# Patient Record
Sex: MALE | Race: WHITE | NOT HISPANIC OR LATINO | Employment: FULL TIME | URBAN - METROPOLITAN AREA
[De-identification: names, ages, dates, MRNs, and addresses within clinical notes are randomized per-mention and may not be internally consistent; named-entity substitution may affect disease eponyms.]

---

## 2019-01-23 PROBLEM — K31.7 POLYP OF STOMACH AND DUODENUM: Status: ACTIVE | Noted: 2019-01-23

## 2019-02-06 ENCOUNTER — ANESTHESIA EVENT (OUTPATIENT)
Dept: PERIOP | Facility: HOSPITAL | Age: 59
End: 2019-02-06
Payer: COMMERCIAL

## 2019-02-06 RX ORDER — SODIUM CHLORIDE, SODIUM LACTATE, POTASSIUM CHLORIDE, CALCIUM CHLORIDE 600; 310; 30; 20 MG/100ML; MG/100ML; MG/100ML; MG/100ML
125 INJECTION, SOLUTION INTRAVENOUS CONTINUOUS
Status: CANCELLED | OUTPATIENT
Start: 2019-02-06

## 2019-02-07 ENCOUNTER — HOSPITAL ENCOUNTER (OUTPATIENT)
Facility: HOSPITAL | Age: 59
Setting detail: OUTPATIENT SURGERY
Discharge: HOME | End: 2019-02-07
Attending: INTERNAL MEDICINE | Admitting: INTERNAL MEDICINE
Payer: COMMERCIAL

## 2019-02-07 ENCOUNTER — ANESTHESIA (OUTPATIENT)
Dept: PERIOP | Facility: HOSPITAL | Age: 59
End: 2019-02-07
Payer: COMMERCIAL

## 2019-02-07 VITALS
OXYGEN SATURATION: 97 % | BODY MASS INDEX: 27.16 KG/M2 | HEIGHT: 71 IN | HEART RATE: 55 BPM | SYSTOLIC BLOOD PRESSURE: 118 MMHG | DIASTOLIC BLOOD PRESSURE: 70 MMHG | RESPIRATION RATE: 18 BRPM | TEMPERATURE: 98 F | WEIGHT: 194 LBS

## 2019-02-07 DIAGNOSIS — K31.7 POLYP OF STOMACH AND DUODENUM: ICD-10-CM

## 2019-02-07 PROCEDURE — 88305 TISSUE EXAM BY PATHOLOGIST: CPT | Performed by: PATHOLOGY

## 2019-02-07 PROCEDURE — 88342 IMHCHEM/IMCYTCHM 1ST ANTB: CPT | Performed by: PATHOLOGY

## 2019-02-07 PROCEDURE — 43239 EGD BIOPSY SINGLE/MULTIPLE: CPT | Performed by: INTERNAL MEDICINE

## 2019-02-07 RX ORDER — SODIUM CHLORIDE, SODIUM LACTATE, POTASSIUM CHLORIDE, CALCIUM CHLORIDE 600; 310; 30; 20 MG/100ML; MG/100ML; MG/100ML; MG/100ML
INJECTION, SOLUTION INTRAVENOUS CONTINUOUS PRN
Status: DISCONTINUED | OUTPATIENT
Start: 2019-02-07 | End: 2019-02-07 | Stop reason: SURG

## 2019-02-07 RX ORDER — PROPOFOL 10 MG/ML
INJECTION, EMULSION INTRAVENOUS AS NEEDED
Status: DISCONTINUED | OUTPATIENT
Start: 2019-02-07 | End: 2019-02-07 | Stop reason: SURG

## 2019-02-07 RX ORDER — OMEPRAZOLE 40 MG/1
CAPSULE, DELAYED RELEASE ORAL
COMMUNITY
Start: 2017-02-21

## 2019-02-07 RX ORDER — ALBUTEROL SULFATE 90 UG/1
AEROSOL, METERED RESPIRATORY (INHALATION)
COMMUNITY
Start: 2016-02-11

## 2019-02-07 RX ORDER — LIDOCAINE HYDROCHLORIDE 10 MG/ML
INJECTION, SOLUTION EPIDURAL; INFILTRATION; INTRACAUDAL; PERINEURAL AS NEEDED
Status: DISCONTINUED | OUTPATIENT
Start: 2019-02-07 | End: 2019-02-07 | Stop reason: SURG

## 2019-02-07 RX ADMIN — LIDOCAINE HYDROCHLORIDE 20 MG: 10 INJECTION, SOLUTION EPIDURAL; INFILTRATION; INTRACAUDAL; PERINEURAL at 08:38

## 2019-02-07 RX ADMIN — PROPOFOL 120 MG: 10 INJECTION, EMULSION INTRAVENOUS at 08:38

## 2019-02-07 RX ADMIN — PROPOFOL 20 MG: 10 INJECTION, EMULSION INTRAVENOUS at 08:41

## 2019-02-07 RX ADMIN — SODIUM CHLORIDE, SODIUM LACTATE, POTASSIUM CHLORIDE, AND CALCIUM CHLORIDE: .6; .31; .03; .02 INJECTION, SOLUTION INTRAVENOUS at 08:18

## 2019-02-07 RX ADMIN — PROPOFOL 30 MG: 10 INJECTION, EMULSION INTRAVENOUS at 08:40

## 2019-02-07 NOTE — ANESTHESIA PREPROCEDURE EVALUATION
Review of Systems/Medical History  Patient summary reviewed  Chart reviewed  No history of anesthetic complications     Cardiovascular  Negative cardio ROS    Pulmonary  Asthma ,        GI/Hepatic    GERD ,             Endo/Other     GYN       Hematology   Musculoskeletal       Neurology   Psychology           Physical Exam    Airway    Mallampati score: II  TM Distance: >3 FB  Neck ROM: full     Dental       Cardiovascular  Comment: Negative ROS,     Pulmonary      Other Findings        Anesthesia Plan  ASA Score- 2     Anesthesia Type- IV sedation with anesthesia with ASA Monitors  Additional Monitors:   Airway Plan:         Plan Factors-    Induction- intravenous  Postoperative Plan-     Informed Consent- Anesthetic plan and risks discussed with patient  I personally reviewed this patient with the CRNA  Discussed and agreed on the Anesthesia Plan with the CRNA  Tk Payne

## 2019-02-07 NOTE — ANESTHESIA POSTPROCEDURE EVALUATION
Post-Op Assessment Note      CV Status:  Stable    Mental Status:  Lethargic    Hydration Status:  Stable    PONV Controlled:  None    Airway Patency:  Patent and adequate    Post Op Vitals Reviewed: Yes          Staff: CRNA           BP   116/66   Temp     Pulse  63   Resp   14   SpO2   100

## 2019-02-07 NOTE — H&P
History and Physical - [facility]  Zelda Grace 62 y o  male MRN: [id number]      HPI: Zelda Grace is a 62y o  year old male who presents for evaluation of gastroesophageal reflux disease and Gómez's esophagus      REVIEW OF SYSTEMS: Per the HPI, and otherwise unremarkable  Historical Information   Past Medical History:   Diagnosis Date    Asthma      Past Surgical History:   Procedure Laterality Date    COLONOSCOPY      KNEE ARTHROSCOPY      ROTATOR CUFF REPAIR       Social History   History   Alcohol Use    Yes     Comment: occacionally     History   Drug Use No     History   Smoking Status    Never Smoker   Smokeless Tobacco    Never Used     History reviewed  No pertinent family history  Meds/Allergies     Prescriptions Prior to Admission   Medication    albuterol (VENTOLIN HFA) 90 mcg/act inhaler    DOCOSAHEXAENOIC ACID PO    mometasone (ASMANEX TWISTHALER) 220 MCG/INH inhaler    omeprazole (PriLOSEC) 40 MG capsule       Allergies   Allergen Reactions    Sulfa Antibiotics Hives     Other reaction(s): hives       Objective     Blood pressure 132/91, pulse 61, temperature (!) 97 3 °F (36 3 °C), temperature source Oral, resp  rate 18, height 5' 11" (1 803 m), weight 88 kg (194 lb 0 1 oz), SpO2 97 %  PHYSICAL EXAM    Gen: NAD  CV: RRR  CHEST: Clear  ABD: soft, NT/ND  EXT: no edema      ASSESSMENT/PLAN:  This is a 62y o  year old male here for evaluation of gastroesophageal reflux disease, Gómez's esophagus, and he is stable and optimized for his procedure      Perform an esophagogastroduodenoscopy with biopsy

## 2019-02-07 NOTE — DISCHARGE INSTRUCTIONS
Upper Endoscopy   WHAT YOU NEED TO KNOW:   An upper endoscopy is also called an upper gastrointestinal (GI) endoscopy, or an esophagogastroduodenoscopy (EGD)  You may feel bloated, gassy, or have some abdominal discomfort after your procedure  Your throat may be sore for 24 to 36 hours  You may burp or pass gas from air that is still inside your body  DISCHARGE INSTRUCTIONS:   Call 911 for any of the following:   · You have sudden chest pain or trouble breathing  Seek care immediately if:   · You feel dizzy or faint  · You have trouble swallowing  · Your bowel movements are very dark or black  · Your abdomen is hard and firm and you have severe pain  · You vomit blood  Contact your healthcare provider if:   · You feel full or bloated and cannot burp or pass gas  · You have not had a bowel movement for 3 days after your procedure  · You have neck pain  · You have a fever or chills  · You have nausea or are vomiting  · You have a rash or hives  · You have questions or concerns about your endoscopy  Relieve a sore throat:  Suck on throat lozenges or crushed ice  Gargle with a small amount of warm salt water  Mix 1 teaspoon of salt and 1 cup of warm water to make salt water  Relieve gas and discomfort from bloating:  Lie on your right side with a heating pad on your abdomen  Take short walks to help pass gas  Eat small meals until bloating is relieved  Rest after your procedure: You have been given medicine to relax you  Do not  drive or make important decisions until the day after your procedure  Return to your normal activity as directed  You can usually return to work the day after your procedure  Follow up with your healthcare provider as directed:  Write down your questions so you remember to ask them during your visits     © 2017 [address] is for End User's use only and may not be sold, redistributed or otherwise used for commercial purposes  All illustrations and images included in CareNotes® are the copyrighted property of A D A M [employer] Inc  or Vasu Donohue  The above information is an  only  It is not intended as medical advice for individual conditions or treatments  Talk to your doctor, nurse or pharmacist before following any medical regimen to see if it is safe and effective for you

## 2019-02-07 NOTE — OP NOTE
ESOPHAGOGASTRODUODENOSCOPY    PROCEDURE: EGD    SEDATION: Monitored anesthesia care, check anesthesia records    ASA Class: 1    INDICATIONS:  Gastroesophageal reflux disease with Gómez's esophagus    CONSENT:  Informed consent was obtained for the procedure, including sedation after explaining the risks and benefits of the procedure  Risks including but not limited to bleeding, perforation, infection, and missed lesion  PREPARATION:   Telemetry, pulse oximetry, blood pressure were monitored throughout the procedure  Patient was identified by myself both verbally and by visual inspection of ID band  DESCRIPTION:   Patient was placed in the left lateral decubitus position and was sedated with the above medication  The gastroscope was introduced in to the oropharynx and the esophagus was intubated under direct visualization  Scope was passed down the esophagus up to 2nd part of the duodenum  A careful inspection was made as the gastroscope was withdrawn, including a retroflexed view of the stomach; findings and interventions are described below  FINDINGS:    #1  Esophagus- there is a 1 cm short segment of  Gómez's esophagus at 40 cm from the incisors  Four-quadrant cold biopsies were obtained to rule out dysplasia  #2  Stomach- A 2 cm hiatal hernia identified  The remaining cardia, fundus, body, antrum, and pylorus were otherwise normal   There was a single semi pedunculated polyp in the body of the stomach which was cold biopsied 2 times  There was minor bleeding that stopped spontaneously  The pylorus was patent and traversed without difficulty  #3  Duodenum- the 1st and 2nd portion of the duodenum were normal         IMPRESSIONS:      1  Short-segment Gómez's esophagus, 4 quadrant biopsies obtained to rule out dysplasia   2  Small hiatal hernia  3  Single polyp of the body of the stomach status post cold biopsy    RECOMMENDATIONS:     1  Continue all current medical therapy  2   Follow up on the results of the routine biopsies obtained in the short-segment Gómez's esophagus  3  Repeat esophagogastroduodenoscopy in 3 years unless the biopsies show evidence of dysplasia in which case endoscopy surveillance will need to be more frequent     COMPLICATIONS:  None; patient tolerated the procedure well      SPECIMENS:  4 quadrant cold biopsies were obtained at the EG junction to rule out dysplasia, specimens were submitted in formalin    ESTIMATED BLOOD LOSS:  Minimal

## 2021-04-09 ENCOUNTER — IMMUNIZATIONS (OUTPATIENT)
Dept: FAMILY MEDICINE CLINIC | Facility: HOSPITAL | Age: 61
End: 2021-04-09

## 2022-04-25 ENCOUNTER — OFFICE VISIT (OUTPATIENT)
Dept: GASTROENTEROLOGY | Facility: CLINIC | Age: 62
End: 2022-04-25
Payer: COMMERCIAL

## 2022-04-25 VITALS
OXYGEN SATURATION: 94 % | BODY MASS INDEX: 27.72 KG/M2 | DIASTOLIC BLOOD PRESSURE: 82 MMHG | SYSTOLIC BLOOD PRESSURE: 122 MMHG | WEIGHT: 198 LBS | HEIGHT: 71 IN | HEART RATE: 84 BPM

## 2022-04-25 DIAGNOSIS — K22.70 BARRETT'S ESOPHAGUS WITHOUT DYSPLASIA: ICD-10-CM

## 2022-04-25 DIAGNOSIS — K21.9 GASTROESOPHAGEAL REFLUX DISEASE, UNSPECIFIED WHETHER ESOPHAGITIS PRESENT: Primary | ICD-10-CM

## 2022-04-25 PROCEDURE — 99213 OFFICE O/P EST LOW 20 MIN: CPT | Performed by: PHYSICIAN ASSISTANT

## 2022-04-25 RX ORDER — LORATADINE 10 MG/1
TABLET ORAL
COMMUNITY

## 2022-04-25 RX ORDER — FLUTICASONE FUROATE 200 UG/1
1 POWDER RESPIRATORY (INHALATION) DAILY
COMMUNITY
Start: 2022-03-14

## 2022-04-25 RX ORDER — MOMETASONE FUROATE 50 UG/1
2 SPRAY, METERED NASAL DAILY
COMMUNITY

## 2022-04-25 NOTE — PROGRESS NOTES
[facility] - Outpatient Follow-up Note  Tera Vu 64 y o  male MRN: [id number]  Encounter: [id number]          ASSESSMENT AND PLAN:      1  Gastroesophageal reflux disease, unspecified whether esophagitis present  2  Gómez's esophagus without dysplasia  He mostly does well on Omeprazole 40mg daily  He has occasional reflux and will take an additional 20mg tablet PRN with relief  Last egd was in 2019 with SSBE w/o dysplasia noted - will update screening    ______________________________________________________________________    SUBJECTIVE:  25-year-old male with a history of GERD complicated by Gómez's esophagus presents for evaluation prior to scheduling a screening EGD  His last EGD was in 2019  Short-segment Gómez's was noted  Biopsy showed intestinal metaplasia without dysplasia  He is taking omeprazole 40 mg once daily  He has occasional issues with heartburn and he will take an additional 20 mg tablet with relief  He denies dysphagia, odynophagia, hematemesis or melena  He has no unexpected weight loss  His last colonoscopy in 2016 was normal   Prior to that 2013 he had a normal colonoscopy as well  REVIEW OF SYSTEMS IS OTHERWISE NEGATIVE  Historical Information   Past Medical History:   Diagnosis Date    Asthma     Gómez esophagus     Diverticulitis of colon     Gastric polyps     GERD (gastroesophageal reflux disease)      Past Surgical History:   Procedure Laterality Date    COLONOSCOPY      KNEE ARTHROSCOPY      [place] ESOPHAGOGASTRODUODENOSCOPY TRANSORAL DIAGNOSTIC N/A 2/7/2019    Procedure: ESOPHAGOGASTRODUODENOSCOPY (EGD); Surgeon: Zach Waldrop DO;  Location: [facility];   Service: Gastroenterology    ROTATOR CUFF REPAIR       Social History   Social History     Substance and Sexual Activity   Alcohol Use Yes    Comment: occacionally     Social History     Substance and Sexual Activity   Drug Use No     Social History     Tobacco Use Smoking Status Never Smoker   Smokeless Tobacco Never Used     History reviewed  No pertinent family history  Meds/Allergies       Current Outpatient Medications:     albuterol (VENTOLIN HFA) 90 mcg/act inhaler    DOCOSAHEXAENOIC ACID PO    fluticasone (Arnuity Ellipta) 200 MCG/ACT AEPB inhaler    loratadine (CLARITIN) 10 mg tablet    mometasone (ASMANEX TWISTHALER) 220 MCG/INH inhaler    mometasone (NASONEX) 50 mcg/act nasal spray    omeprazole (PriLOSEC) 40 MG capsule    Allergies   Allergen Reactions    Sulfa Antibiotics Hives     Other reaction(s): hives           Objective     Blood pressure 122/82, pulse 84, height 5' 11" (1 803 m), weight 89 8 kg (198 lb), SpO2 94 %  Body mass index is 27 62 kg/m²  PHYSICAL EXAM:      General Appearance:   Alert, cooperative, no distress   HEENT:   Normocephalic, atraumatic, anicteric      Neck:  Supple, symmetrical, trachea midline   Lungs:   Clear to auscultation bilaterally; no rales, rhonchi or wheezing; respirations unlabored    Heart[de-identified]   Regular rate and rhythm; no murmur, rub, or gallop  Abdomen:   Soft, non-tender, non-distended; normal bowel sounds; no masses, no organomegaly    Genitalia:   Deferred    Rectal:   Deferred    Extremities:  No cyanosis, clubbing or edema    Pulses:  2+ and symmetric    Skin:  No jaundice, rashes, or lesions    Lymph nodes:  No palpable cervical lymphadenopathy        Lab Results:   No visits with results within 1 Day(s) from this visit     Latest known visit with results is:   Admission on 02/07/2019, Discharged on 02/07/2019   Component Date Value    Case Report 02/07/2019                      Value:Surgical Pathology Report                         Case: [id number]                                   Authorizing Provider:  Anna Guillermo DO          Collected:           02/07/2019 0840              Ordering Location:     [address] [place] [address] Received:            02/07/2019 1203 Operating Room                                                               Pathologist:           Mendel Spangle, DO                                                            Specimens:   A) - Esophagus, esophagus 40cm                                                                      B) - Polyp, Stomach/Small Intestine, body                                                  Final Diagnosis 02/07/2019                      Value: This result contains rich text formatting which cannot be displayed here   Microscopic Description 02/07/2019                      Value: This result contains rich text formatting which cannot be displayed here   Additional Information 02/07/2019                      Value: This result contains rich text formatting which cannot be displayed here  Maria Isabelaria Reil Gross Description 02/07/2019                      Value: This result contains rich text formatting which cannot be displayed here   Clinical Information 02/07/2019                      Value:Cold biopsies r/o dysplasia         Radiology Results:   No results found

## 2022-04-25 NOTE — PATIENT INSTRUCTIONS
Scheduled date of EGD(as of today):5/26/22   Physician performing EGD:Saritha  Location of EGD:[place]  Instructions reviewed with patient by:Karena BOURGEOIS  Clearances: none

## 2022-05-26 ENCOUNTER — ANESTHESIA EVENT (OUTPATIENT)
Dept: GASTROENTEROLOGY | Facility: HOSPITAL | Age: 62
End: 2022-05-26

## 2022-05-26 ENCOUNTER — ANESTHESIA (OUTPATIENT)
Dept: GASTROENTEROLOGY | Facility: HOSPITAL | Age: 62
End: 2022-05-26

## 2022-05-26 ENCOUNTER — HOSPITAL ENCOUNTER (OUTPATIENT)
Dept: GASTROENTEROLOGY | Facility: HOSPITAL | Age: 62
Setting detail: OUTPATIENT SURGERY
Discharge: HOME | End: 2022-05-26
Payer: COMMERCIAL

## 2022-05-26 VITALS
RESPIRATION RATE: 16 BRPM | TEMPERATURE: 97.8 F | WEIGHT: 193.34 LBS | HEIGHT: 71 IN | BODY MASS INDEX: 27.07 KG/M2 | SYSTOLIC BLOOD PRESSURE: 112 MMHG | DIASTOLIC BLOOD PRESSURE: 72 MMHG | HEART RATE: 57 BPM | OXYGEN SATURATION: 98 %

## 2022-05-26 DIAGNOSIS — K21.9 GASTROESOPHAGEAL REFLUX DISEASE, UNSPECIFIED WHETHER ESOPHAGITIS PRESENT: ICD-10-CM

## 2022-05-26 DIAGNOSIS — K22.70 BARRETT'S ESOPHAGUS WITHOUT DYSPLASIA: ICD-10-CM

## 2022-05-26 PROCEDURE — 43239 EGD BIOPSY SINGLE/MULTIPLE: CPT | Performed by: INTERNAL MEDICINE

## 2022-05-26 PROCEDURE — 88305 TISSUE EXAM BY PATHOLOGIST: CPT | Performed by: PATHOLOGY

## 2022-05-26 RX ORDER — SODIUM CHLORIDE, SODIUM LACTATE, POTASSIUM CHLORIDE, CALCIUM CHLORIDE 600; 310; 30; 20 MG/100ML; MG/100ML; MG/100ML; MG/100ML
125 INJECTION, SOLUTION INTRAVENOUS CONTINUOUS
Status: CANCELLED | OUTPATIENT
Start: 2022-05-26

## 2022-05-26 RX ORDER — PROPOFOL 10 MG/ML
INJECTION, EMULSION INTRAVENOUS AS NEEDED
Status: DISCONTINUED | OUTPATIENT
Start: 2022-05-26 | End: 2022-05-26

## 2022-05-26 RX ORDER — SODIUM CHLORIDE, SODIUM LACTATE, POTASSIUM CHLORIDE, CALCIUM CHLORIDE 600; 310; 30; 20 MG/100ML; MG/100ML; MG/100ML; MG/100ML
INJECTION, SOLUTION INTRAVENOUS CONTINUOUS PRN
Status: DISCONTINUED | OUTPATIENT
Start: 2022-05-26 | End: 2022-05-26

## 2022-05-26 RX ORDER — SODIUM CHLORIDE, SODIUM LACTATE, POTASSIUM CHLORIDE, CALCIUM CHLORIDE 600; 310; 30; 20 MG/100ML; MG/100ML; MG/100ML; MG/100ML
125 INJECTION, SOLUTION INTRAVENOUS CONTINUOUS
Status: DISCONTINUED | OUTPATIENT
Start: 2022-05-26 | End: 2022-05-30 | Stop reason: HOSPADM

## 2022-05-26 RX ORDER — LIDOCAINE HYDROCHLORIDE 20 MG/ML
INJECTION, SOLUTION EPIDURAL; INFILTRATION; INTRACAUDAL; PERINEURAL AS NEEDED
Status: DISCONTINUED | OUTPATIENT
Start: 2022-05-26 | End: 2022-05-26

## 2022-05-26 RX ADMIN — PROPOFOL 65 MG: 10 INJECTION, EMULSION INTRAVENOUS at 10:15

## 2022-05-26 RX ADMIN — PROPOFOL 45 MG: 10 INJECTION, EMULSION INTRAVENOUS at 10:18

## 2022-05-26 RX ADMIN — LIDOCAINE HYDROCHLORIDE 100 MG: 20 INJECTION, SOLUTION EPIDURAL; INFILTRATION; INTRACAUDAL at 10:14

## 2022-05-26 RX ADMIN — SODIUM CHLORIDE, SODIUM LACTATE, POTASSIUM CHLORIDE, AND CALCIUM CHLORIDE: .6; .31; .03; .02 INJECTION, SOLUTION INTRAVENOUS at 08:17

## 2022-05-26 RX ADMIN — PROPOFOL 75 MG: 10 INJECTION, EMULSION INTRAVENOUS at 10:16

## 2022-05-26 NOTE — ANESTHESIA POSTPROCEDURE EVALUATION
Post-Op Assessment Note    CV Status:  Stable  Pain Score: 0    Pain management: adequate     Mental Status:  Alert and awake   Hydration Status:  Euvolemic   PONV Controlled:  Controlled   Airway Patency:  Patent      Post Op Vitals Reviewed: Yes      Staff: CRNA         No complications documented      /58 (05/26/22 1025)    Temp 97 8 °F (36 6 °C) (05/26/22 1025)    Pulse 58 (05/26/22 1025)   Resp 16 (05/26/22 1025)    SpO2 95 % (05/26/22 1025)

## 2022-05-26 NOTE — H&P
History and Physical - [facility]  Gulshan Luna 64 y o  male MRN: [id number]      HPI: Gulshan Luna is a 64y o  year old male who presents for evaluation of gastroesophageal reflux disease and Gómez's esophagus      REVIEW OF SYSTEMS: Per the HPI, and otherwise unremarkable  Historical Information   Past Medical History:   Diagnosis Date    Asthma     Gómez esophagus     Colon polyp     Diverticulitis of colon     Gastric polyps     GERD (gastroesophageal reflux disease)      Past Surgical History:   Procedure Laterality Date    COLONOSCOPY      KNEE ARTHROSCOPY      [place] ESOPHAGOGASTRODUODENOSCOPY TRANSORAL DIAGNOSTIC N/A 2/7/2019    Procedure: ESOPHAGOGASTRODUODENOSCOPY (EGD); Surgeon: Aldo Morgan DO;  Location: [facility]; Service: Gastroenterology    ROTATOR CUFF REPAIR       Social History   Social History     Substance and Sexual Activity   Alcohol Use Yes    Comment: occacionally     Social History     Substance and Sexual Activity   Drug Use No     Social History     Tobacco Use   Smoking Status Never Smoker   Smokeless Tobacco Never Used     History reviewed  No pertinent family history  Meds/Allergies     (Not in a hospital admission)      Allergies   Allergen Reactions    Sulfa Antibiotics Hives     Other reaction(s): hives       Objective     Blood pressure 123/95, pulse 62, temperature 98 3 °F (36 8 °C), temperature source Temporal, resp  rate 16, height 5' 11" (1 803 m), weight 87 7 kg (193 lb 5 5 oz), SpO2 98 %  PHYSICAL EXAM    Gen: NAD  CV: RRR  CHEST: Clear  ABD: soft, NT/ND  EXT: no edema      ASSESSMENT/PLAN:  This is a 64y o  year old male here for EGD with biopsies, and he is stable and optimized for his procedure

## 2022-05-26 NOTE — ANESTHESIA PREPROCEDURE EVALUATION
Procedure:  EGD          HPI: Gulshan Luna is a 64y o  year old male who presents for evaluation of gastroesophageal reflux disease complicated by Gómez's esophagus     Physical Exam    Airway    Mallampati score: II  TM Distance: >3 FB  Neck ROM: full     Dental       Cardiovascular  Cardiovascular exam normal    Pulmonary  Pulmonary exam normal     Other Findings       History Comments History Comments   Asthma  GERD (gastroesophageal reflux disease)    Gómez esophagus  Diverticulitis of colon    Gastric polyps  Colon polyp        Surgical History     Current as of 05/26/22 0913  KNEE ARTHROSCOPY ROTATOR CUFF REPAIR   COLONOSCOPY [place] ESOPHAGOGASTRODUODENOSCOPY TRANSORAL DIAGNOSTIC     Substance History     Current as of 05/26/22 0913  Smoking Status: Never Smoker   Smokeless Tobacco Status: Never Used   Alcohol use: Yes, unspecified volume   Drug use: No       Anesthesia Plan  ASA Score- 2     Anesthesia Type- IV sedation with anesthesia with ASA Monitors  Additional Monitors:   Airway Plan:           Plan Factors-Exercise tolerance (METS): >4 METS  Chart reviewed  Existing labs reviewed  Patient summary reviewed  Patient is not a current smoker  Induction- intravenous  Postoperative Plan-     Informed Consent- Anesthetic plan and risks discussed with patient  I personally reviewed this patient with the CRNA  Discussed and agreed on the Anesthesia Plan with the CRNA  Kodi Shaikh

## 2022-05-26 NOTE — H&P
History and Physical - [facility]  Britany Whitfield 64 y o  male MRN: [id number]      HPI: Britany Whitfield is a 64y o  year old male who presents for evaluation of gastroesophageal reflux disease complicated by Gómez's esophagus      REVIEW OF SYSTEMS: Per the HPI, and otherwise unremarkable  Historical Information   Past Medical History:   Diagnosis Date    Asthma     Gómez esophagus     Colon polyp     Diverticulitis of colon     Gastric polyps     GERD (gastroesophageal reflux disease)      Past Surgical History:   Procedure Laterality Date    COLONOSCOPY      KNEE ARTHROSCOPY      [place] ESOPHAGOGASTRODUODENOSCOPY TRANSORAL DIAGNOSTIC N/A 2/7/2019    Procedure: ESOPHAGOGASTRODUODENOSCOPY (EGD); Surgeon: Romero Ford DO;  Location: [facility]; Service: Gastroenterology    ROTATOR CUFF REPAIR       Social History   Social History     Substance and Sexual Activity   Alcohol Use Yes    Comment: occacionally     Social History     Substance and Sexual Activity   Drug Use No     Social History     Tobacco Use   Smoking Status Never Smoker   Smokeless Tobacco Never Used     History reviewed  No pertinent family history  Meds/Allergies     (Not in a hospital admission)      Allergies   Allergen Reactions    Sulfa Antibiotics Hives     Other reaction(s): hives       Objective     Blood pressure 123/95, pulse 62, temperature 98 3 °F (36 8 °C), temperature source Temporal, resp  rate 16, height 5' 11" (1 803 m), weight 87 7 kg (193 lb 5 5 oz), SpO2 98 %  PHYSICAL EXAM    Gen: NAD  CV: RRR  CHEST: Clear  ABD: soft, NT/ND  EXT: no edema      ASSESSMENT/PLAN:  This is a 64y o  year old male here for EGD with biopsies, and he is stable and optimized for his procedure

## 2022-06-08 ENCOUNTER — TELEPHONE (OUTPATIENT)
Dept: GASTROENTEROLOGY | Facility: CLINIC | Age: 62
End: 2022-06-08

## 2022-06-08 NOTE — TELEPHONE ENCOUNTER
----- Message from Tres Muñoz DO sent at 6/8/2022 12:42 PM EDT -----  The please call the patient with the biopsy results  Biopsies the esophagus were unremarkable  There was no evidence of Gómez's esophagus or cancer

## 2024-06-02 ENCOUNTER — HOSPITAL ENCOUNTER (EMERGENCY)
Facility: HOSPITAL | Age: 64
Discharge: HOME | End: 2024-06-02
Attending: EMERGENCY MEDICINE
Payer: COMMERCIAL

## 2024-06-02 VITALS
RESPIRATION RATE: 18 BRPM | HEART RATE: 68 BPM | OXYGEN SATURATION: 98 % | DIASTOLIC BLOOD PRESSURE: 78 MMHG | SYSTOLIC BLOOD PRESSURE: 138 MMHG | TEMPERATURE: 98.2 F

## 2024-06-02 DIAGNOSIS — H53.9 VISUAL DISTURBANCE: Primary | ICD-10-CM

## 2024-06-02 PROCEDURE — 99283 EMERGENCY DEPT VISIT LOW MDM: CPT

## 2024-06-02 PROCEDURE — 99284 EMERGENCY DEPT VISIT MOD MDM: CPT | Performed by: EMERGENCY MEDICINE

## 2024-06-02 NOTE — ED PROVIDER NOTES
"History  Chief Complaint   Patient presents with    Eye Problem     Pt states \"I have a black spot in my vision from my L eye that started yesterday. Denies headache/dizziness/blurred vision. Denies injury to eye.\"     63-year-old male, presents with visual disturbance.  Patient states he has noticed thin, black, squiggly line in left eye, left upper lateral field of vision.  Constant since yesterday afternoon.  Denies any associated eye pain, blurry vision, loss of vision, headache.  Patient wears glasses.  No history of similar symptoms.      History provided by:  Patient   used: No        Prior to Admission Medications   Prescriptions Last Dose Informant Patient Reported? Taking?   DOCOSAHEXAENOIC ACID PO  Self Yes No   Sig: as directed   albuterol (PROVENTIL HFA,VENTOLIN HFA) 90 mcg/act inhaler  Self Yes No   Si puff(s)   fluticasone (Arnuity Ellipta) 200 MCG/ACT AEPB inhaler  Self Yes No   Sig: Take 1 puff by mouth daily   loratadine (CLARITIN) 10 mg tablet  Self Yes No   Si tab(s)   mometasone (ASMANEX TWISTHALER) 220 MCG/INH inhaler  Self Yes No   Sig: inhale 1 puff by mouth every 12 hours   mometasone (NASONEX) 50 mcg/act nasal spray  Self Yes No   Si sprays into each nostril daily   omeprazole (PriLOSEC) 40 MG capsule  Self Yes No   Si cap(s)      Facility-Administered Medications: None       Past Medical History:   Diagnosis Date    Asthma     Gómez esophagus     Colon polyp     Diverticulitis of colon     Gastric polyps     GERD (gastroesophageal reflux disease)        Past Surgical History:   Procedure Laterality Date    COLONOSCOPY      KNEE ARTHROSCOPY      [place] ESOPHAGOGASTRODUODENOSCOPY TRANSORAL DIAGNOSTIC N/A 2019    Procedure: ESOPHAGOGASTRODUODENOSCOPY (EGD);  Surgeon: Trip Melara DO;  Location: [facility];  Service: Gastroenterology    ROTATOR CUFF REPAIR         History reviewed. No pertinent family history.  I have reviewed and agree with the history " as documented.    E-Cigarette/Vaping    E-Cigarette Use Never User      E-Cigarette/Vaping Substances    Nicotine No     THC No     CBD No     Flavoring No     Other No     Unknown No      Social History     Tobacco Use    Smoking status: Never    Smokeless tobacco: Never   Vaping Use    Vaping status: Never Used   Substance Use Topics    Alcohol use: Yes     Comment: occacionally    Drug use: No       Review of Systems   Constitutional: Negative.  Negative for fever.   Eyes:  Negative for pain and redness.   Neurological: Negative.  Negative for weakness, numbness and headaches.       Physical Exam  Physical Exam  Vitals and nursing note reviewed.   Constitutional:       General: He is not in acute distress.  HENT:      Head: Normocephalic and atraumatic.   Eyes:      General: Vision grossly intact.      Extraocular Movements: Extraocular movements intact.      Conjunctiva/sclera: Conjunctivae normal.      Pupils: Pupils are equal, round, and reactive to light.      Visual Fields: Right eye visual fields normal and left eye visual fields normal.   Cardiovascular:      Rate and Rhythm: Normal rate.   Pulmonary:      Effort: Pulmonary effort is normal.   Neurological:      General: No focal deficit present.      Mental Status: He is alert and oriented to person, place, and time.      Motor: No weakness.      Gait: Gait normal.      Comments: No facial weakness, normal speech         Vital Signs  ED Triage Vitals [06/02/24 1826]   Temperature Pulse Respirations Blood Pressure SpO2   98.2 °F (36.8 °C) 68 18 151/82 98 %      Temp Source Heart Rate Source Patient Position - Orthostatic VS BP Location FiO2 (%)   Temporal Monitor Sitting Left arm --      Pain Score       --           Vitals:    06/02/24 1826 06/02/24 1930   BP: 151/82 138/78   Pulse: 68    Patient Position - Orthostatic VS: Sitting          Visual Acuity      ED Medications  Medications - No data to display    Diagnostic Studies  Results Reviewed        None                   No orders to display              Procedures  Procedures         ED Course                               SBIRT 20yo+      Flowsheet Row Most Recent Value   Initial Alcohol Screen: US AUDIT-C     1. How often do you have a drink containing alcohol? 0 Filed at: 06/02/2024 1828   2. How many drinks containing alcohol do you have on a typical day you are drinking?  0 Filed at: 06/02/2024 1828   3a. Male UNDER 65: How often do you have five or more drinks on one occasion? 0 Filed at: 06/02/2024 1828   Audit-C Score 0 Filed at: 06/02/2024 1828   NELLY: How many times in the past year have you...    Used an illegal drug or used a prescription medication for non-medical reasons? Never Filed at: 06/02/2024 1828                      Medical Decision Making  63-year-old male, presents with visual disturbance.  Patient reports squiggly line in the left eye left upper field of vision since yesterday.  Differential diagnosis includes retinal detachment, vitreous hemorrhage, foreign body among other diagnoses.  Patient visual fields intact, no abnormality noted on visual inspection of the eye.  Discussed with on-call ophthalmology Dr. Duke, will see patient in office tomorrow.  Patient states he will follow-up with ophthalmology tomorrow.             Disposition  Final diagnoses:   Visual disturbance     Time reflects when diagnosis was documented in both MDM as applicable and the Disposition within this note       Time User Action Codes Description Comment    6/2/2024  7:45 PM Billy Shaver Add [H53.9] Visual disturbance           ED Disposition       ED Disposition   Discharge    Condition   Stable    Date/Time   Sun Jun 2, 2024 1945    Comment   Singh Tapia discharge to home/self care.                   Follow-up Information       Follow up With Specialties Details Why Contact Info    Saúl Duke MD Ophthalmology Call in 1 day  [address]  [address]  [place]  [address]  [phone]              Discharge Medication List as of 6/2/2024  7:46 PM        CONTINUE these medications which have NOT CHANGED    Details   albuterol (PROVENTIL HFA,VENTOLIN HFA) 90 mcg/act inhaler 2 puff(s), Historical Med      DOCOSAHEXAENOIC ACID PO as directed, Historical Med      fluticasone (Arnuity Ellipta) 200 MCG/ACT AEPB inhaler Take 1 puff by mouth daily, Starting Mon 3/14/2022, Historical Med      loratadine (CLARITIN) 10 mg tablet 1 tab(s), Historical Med      mometasone (ASMANEX TWISTHALER) 220 MCG/INH inhaler inhale 1 puff by mouth every 12 hours, Historical Med      mometasone (NASONEX) 50 mcg/act nasal spray 2 sprays into each nostril daily, Historical Med      omeprazole (PriLOSEC) 40 MG capsule 1 cap(s), Historical Med             No discharge procedures on file.    PDMP Review       None            ED Provider  Electronically Signed by             Billy Shaver MD  06/02/24 1953

## 2024-10-30 DIAGNOSIS — K21.9 GASTROESOPHAGEAL REFLUX DISEASE WITHOUT ESOPHAGITIS: Primary | ICD-10-CM

## 2024-10-30 NOTE — TELEPHONE ENCOUNTER
Was told to get medication filled through Dr. Melara from last provider who prescribed it.      Reason for call:   [x] Refill   [] Prior Auth  [] Other: Prescribed by another doctor    Office:   [] PCP/Provider -   [x] Specialty/Provider - Saritha    Medication: Prilosec    Dose/Frequency: 40 mg     Quantity: #90    Pharmacy: [place] [id number] [address]     Does the patient have enough for 3 days?   [x] Yes   [] No - Send as HP to POD

## 2024-10-31 RX ORDER — OMEPRAZOLE 40 MG/1
40 CAPSULE, DELAYED RELEASE ORAL DAILY
Qty: 90 CAPSULE | Refills: 0 | Status: SHIPPED | OUTPATIENT
Start: 2024-10-31

## 2025-02-06 DIAGNOSIS — K21.9 GASTROESOPHAGEAL REFLUX DISEASE WITHOUT ESOPHAGITIS: ICD-10-CM

## 2025-02-06 RX ORDER — OMEPRAZOLE 40 MG/1
40 CAPSULE, DELAYED RELEASE ORAL DAILY
Qty: 90 CAPSULE | Refills: 0 | Status: SHIPPED | OUTPATIENT
Start: 2025-02-06

## 2025-02-17 ENCOUNTER — OFFICE VISIT (OUTPATIENT)
Dept: GASTROENTEROLOGY | Facility: CLINIC | Age: 65
End: 2025-02-17
Payer: COMMERCIAL

## 2025-02-17 VITALS
BODY MASS INDEX: 27.58 KG/M2 | HEART RATE: 73 BPM | HEIGHT: 71 IN | SYSTOLIC BLOOD PRESSURE: 111 MMHG | DIASTOLIC BLOOD PRESSURE: 66 MMHG | TEMPERATURE: 97.4 F | WEIGHT: 197 LBS | OXYGEN SATURATION: 97 %

## 2025-02-17 DIAGNOSIS — K21.9 GASTROESOPHAGEAL REFLUX DISEASE WITHOUT ESOPHAGITIS: Primary | ICD-10-CM

## 2025-02-17 DIAGNOSIS — Z80.0 FAMILY HISTORY OF COLON CANCER: ICD-10-CM

## 2025-02-17 DIAGNOSIS — Z86.0100 HISTORY OF COLON POLYPS: ICD-10-CM

## 2025-02-17 PROCEDURE — 99213 OFFICE O/P EST LOW 20 MIN: CPT | Performed by: PHYSICIAN ASSISTANT

## 2025-02-17 RX ORDER — BIOTIN 10000 MCG
CAPSULE ORAL
COMMUNITY

## 2025-02-17 NOTE — PATIENT INSTRUCTIONS
Scheduled date of EGD/colonoscopy (as of today):4/21/25  Physician performing EGD/colonoscopy:Saritha  Location of EGD/colonoscopy:[facility]  Desired bowel prep reviewed with patient:Radha/Miralax  Instructions reviewed with patient by:Milton langford  Clearances:   none

## 2025-02-17 NOTE — PROGRESS NOTES
Name: Singh Tapia      : 1960      MRN: [id number]  Encounter Provider: Autumn Gan PA-C  Encounter Date: 2025   Encounter department: [facility] [place]  :  Assessment & Plan  Gastroesophageal reflux disease without esophagitis  Chronic  Maintains on Omeprazole 40mg qAM and 20mg qPM  No complaints at present  History of Barretts esophagus diagnosed in  with biopsies from  and  being negative  Agreed to repeat 1 more screening exam and if biopsies are negative x 3 no need for ongoing screening  Continue to keep reflux well controlled    History of colon polyps  Colonoscopy in  with a tubular adenoma removed  Family history of colon cancer  Father had polyps and great-grandfather had colon cancer  His last colonoscopy was in  and was normal  Will update routine screening at this time        History of Present Illness   HPI  Singh Tapia is a 64 y.o. male with a history of GERD complicated by Gómez's esophagus, colon polyps, diverticulosis, asthma who presents for routine follow-up.  Overall the patient is doing very well.  He maintains on omeprazole 40 mg once daily in the morning and 20 mg at bedtime.  He reports rare episodes of reflux.  He denies any dysphagia, vomiting or hematemesis.  There is no recent unexpected weight loss.  His last EGD was in .  Short segment Gómez's esophagus was noted however biopsies were negative for intestinal metaplasia.  Prior to this in  his biopsies were negative as well.  He denies any current lower GI symptoms such as abdominal pain, diarrhea or constipation.  He admits that he is very cautious with his diet and eats plenty of fiber to maintain this.  His last colonoscopy in  was normal.  Prior to this in  he had a polyp removed.    History obtained from: patient    Review of Systems  Medical History Reviewed by provider this encounter:     .  Past Medical History   Past Medical  History:   Diagnosis Date    Asthma     Gómez esophagus     Colon polyp     Diverticulitis of colon     Gastric polyps     GERD (gastroesophageal reflux disease)      Past Surgical History:   Procedure Laterality Date    COLONOSCOPY      KNEE ARTHROSCOPY      [place] ESOPHAGOGASTRODUODENOSCOPY TRANSORAL DIAGNOSTIC N/A 2/7/2019    Procedure: ESOPHAGOGASTRODUODENOSCOPY (EGD);  Surgeon: Trip Melara DO;  Location: [facility];  Service: Gastroenterology    ROTATOR CUFF REPAIR       History reviewed. No pertinent family history.   reports that he has never smoked. He has never used smokeless tobacco. He reports current alcohol use. He reports that he does not use drugs.  Current Outpatient Medications on File Prior to Visit   Medication Sig Dispense Refill    albuterol (PROVENTIL HFA,VENTOLIN HFA) 90 mcg/act inhaler 2 puff(s)      fluticasone (Arnuity Ellipta) 200 MCG/ACT AEPB inhaler Take 1 puff by mouth daily      loratadine (CLARITIN) 10 mg tablet 1 tab(s)      mometasone (NASONEX) 50 mcg/act nasal spray 2 sprays into each nostril daily      omeprazole (PriLOSEC) 40 MG capsule TAKE 1 CAPSULE (40 MG TOTAL) BY MOUTH DAILY. 90 capsule 0    Biotin 10 MG CAPS Take by mouth      [DISCONTINUED] DOCOSAHEXAENOIC ACID PO as directed (Patient not taking: Reported on 2/17/2025)      [DISCONTINUED] mometasone (ASMANEX TWISTHALER) 220 MCG/INH inhaler inhale 1 puff by mouth every 12 hours (Patient not taking: Reported on 2/17/2025)       No current facility-administered medications on file prior to visit.     Allergies   Allergen Reactions    Pollen Extract Allergic Rhinitis    Sulfa Antibiotics Hives     Other reaction(s): hives      Current Outpatient Medications on File Prior to Visit   Medication Sig Dispense Refill    albuterol (PROVENTIL HFA,VENTOLIN HFA) 90 mcg/act inhaler 2 puff(s)      fluticasone (Arnuity Ellipta) 200 MCG/ACT AEPB inhaler Take 1 puff by mouth daily      loratadine (CLARITIN) 10 mg tablet 1 tab(s)       "mometasone (NASONEX) 50 mcg/act nasal spray 2 sprays into each nostril daily      omeprazole (PriLOSEC) 40 MG capsule TAKE 1 CAPSULE (40 MG TOTAL) BY MOUTH DAILY. 90 capsule 0    Biotin 10 MG CAPS Take by mouth      [DISCONTINUED] DOCOSAHEXAENOIC ACID PO as directed (Patient not taking: Reported on 2/17/2025)      [DISCONTINUED] mometasone (ASMANEX TWISTHALER) 220 MCG/INH inhaler inhale 1 puff by mouth every 12 hours (Patient not taking: Reported on 2/17/2025)       No current facility-administered medications on file prior to visit.      Social History     Tobacco Use    Smoking status: Never    Smokeless tobacco: Never   Vaping Use    Vaping status: Never Used   Substance and Sexual Activity    Alcohol use: Yes     Comment: occacionally    Drug use: No    Sexual activity: Not on file        Objective   /66 (BP Location: Left arm, Patient Position: Sitting, Cuff Size: Large)   Pulse 73   Temp (!) 97.4 °F (36.3 °C) (Tympanic)   Ht 5' 11\" (1.803 m)   Wt 89.4 kg (197 lb)   SpO2 97%   BMI 27.48 kg/m²      Physical Exam      "

## 2025-04-21 ENCOUNTER — ANESTHESIA EVENT (OUTPATIENT)
Dept: GASTROENTEROLOGY | Facility: HOSPITAL | Age: 65
End: 2025-04-21
Payer: COMMERCIAL

## 2025-04-21 ENCOUNTER — ANESTHESIA (OUTPATIENT)
Dept: GASTROENTEROLOGY | Facility: HOSPITAL | Age: 65
End: 2025-04-21
Payer: COMMERCIAL

## 2025-04-21 ENCOUNTER — HOSPITAL ENCOUNTER (OUTPATIENT)
Dept: GASTROENTEROLOGY | Facility: HOSPITAL | Age: 65
Setting detail: OUTPATIENT SURGERY
Discharge: HOME | End: 2025-04-21
Attending: PHYSICIAN ASSISTANT
Payer: COMMERCIAL

## 2025-04-21 VITALS
SYSTOLIC BLOOD PRESSURE: 118 MMHG | WEIGHT: 202.6 LBS | RESPIRATION RATE: 18 BRPM | DIASTOLIC BLOOD PRESSURE: 60 MMHG | HEART RATE: 68 BPM | BODY MASS INDEX: 28.36 KG/M2 | TEMPERATURE: 97.2 F | OXYGEN SATURATION: 97 % | HEIGHT: 71 IN

## 2025-04-21 DIAGNOSIS — Z80.0 FAMILY HISTORY OF COLON CANCER: ICD-10-CM

## 2025-04-21 DIAGNOSIS — K21.9 GASTROESOPHAGEAL REFLUX DISEASE WITHOUT ESOPHAGITIS: ICD-10-CM

## 2025-04-21 DIAGNOSIS — Z86.0100 HISTORY OF COLON POLYPS: ICD-10-CM

## 2025-04-21 PROCEDURE — 45380 COLONOSCOPY AND BIOPSY: CPT | Performed by: INTERNAL MEDICINE

## 2025-04-21 PROCEDURE — 88305 TISSUE EXAM BY PATHOLOGIST: CPT | Performed by: STUDENT IN AN ORGANIZED HEALTH CARE EDUCATION/TRAINING PROGRAM

## 2025-04-21 PROCEDURE — 43239 EGD BIOPSY SINGLE/MULTIPLE: CPT | Performed by: INTERNAL MEDICINE

## 2025-04-21 RX ORDER — SODIUM CHLORIDE, SODIUM GLUCONATE, SODIUM ACETATE, POTASSIUM CHLORIDE, MAGNESIUM CHLORIDE, SODIUM PHOSPHATE, DIBASIC, AND POTASSIUM PHOSPHATE .53; .5; .37; .037; .03; .012; .00082 G/100ML; G/100ML; G/100ML; G/100ML; G/100ML; G/100ML; G/100ML
INJECTION, SOLUTION INTRAVENOUS CONTINUOUS PRN
Status: DISCONTINUED | OUTPATIENT
Start: 2025-04-21 | End: 2025-04-21

## 2025-04-21 RX ORDER — LIDOCAINE HYDROCHLORIDE 20 MG/ML
INJECTION, SOLUTION EPIDURAL; INFILTRATION; INTRACAUDAL; PERINEURAL AS NEEDED
Status: DISCONTINUED | OUTPATIENT
Start: 2025-04-21 | End: 2025-04-21

## 2025-04-21 RX ORDER — PROPOFOL 10 MG/ML
INJECTION, EMULSION INTRAVENOUS AS NEEDED
Status: DISCONTINUED | OUTPATIENT
Start: 2025-04-21 | End: 2025-04-21

## 2025-04-21 RX ADMIN — SODIUM CHLORIDE, SODIUM GLUCONATE, SODIUM ACETATE, POTASSIUM CHLORIDE, MAGNESIUM CHLORIDE, SODIUM PHOSPHATE, DIBASIC, AND POTASSIUM PHOSPHATE: .53; .5; .37; .037; .03; .012; .00082 INJECTION, SOLUTION INTRAVENOUS at 12:14

## 2025-04-21 RX ADMIN — LIDOCAINE HYDROCHLORIDE 100 MG: 20 INJECTION, SOLUTION EPIDURAL; INFILTRATION; INTRACAUDAL; PERINEURAL at 12:28

## 2025-04-21 RX ADMIN — PROPOFOL 20 MG: 10 INJECTION, EMULSION INTRAVENOUS at 12:30

## 2025-04-21 RX ADMIN — PROPOFOL 40 MG: 10 INJECTION, EMULSION INTRAVENOUS at 12:37

## 2025-04-21 RX ADMIN — PROPOFOL 40 MG: 10 INJECTION, EMULSION INTRAVENOUS at 12:42

## 2025-04-21 RX ADMIN — PROPOFOL 40 MG: 10 INJECTION, EMULSION INTRAVENOUS at 12:46

## 2025-04-21 RX ADMIN — PROPOFOL 30 MG: 10 INJECTION, EMULSION INTRAVENOUS at 12:50

## 2025-04-21 RX ADMIN — PROPOFOL 150 MG: 10 INJECTION, EMULSION INTRAVENOUS at 12:28

## 2025-04-21 RX ADMIN — PROPOFOL 40 MG: 10 INJECTION, EMULSION INTRAVENOUS at 12:33

## 2025-04-21 NOTE — H&P
"History and Physical -  [facility]  Singh Tapia 64 y.o. male MRN: [id number]      HPI: Singh Taipa is a 64 y.o. year old male who presents for evaluation of gastroesophageal reflux disease, personal history of colon polyps, and a family history of colon cancer      REVIEW OF SYSTEMS: Per the HPI, and otherwise unremarkable.    Historical Information   Past Medical History:   Diagnosis Date    Asthma     Gómez esophagus     Colon polyp     Diverticulitis of colon     Gastric polyps     GERD (gastroesophageal reflux disease)      Past Surgical History:   Procedure Laterality Date    COLONOSCOPY      KNEE ARTHROSCOPY      NASAL SEPTUM SURGERY      [place] ESOPHAGOGASTRODUODENOSCOPY TRANSORAL DIAGNOSTIC N/A 02/07/2019    Procedure: ESOPHAGOGASTRODUODENOSCOPY (EGD);  Surgeon: Trip Melara DO;  Location: [facility];  Service: Gastroenterology    ROTATOR CUFF REPAIR       Social History   Social History     Substance and Sexual Activity   Alcohol Use Yes    Comment: seldom     Social History     Substance and Sexual Activity   Drug Use No     Social History     Tobacco Use   Smoking Status Never   Smokeless Tobacco Never     History reviewed. No pertinent family history.    Meds/Allergies     Not in a hospital admission.    Allergies   Allergen Reactions    Pollen Extract Allergic Rhinitis    Sulfa Antibiotics Hives     Other reaction(s): hives       Objective     Blood pressure 129/76, pulse 65, temperature 97.9 °F (36.6 °C), temperature source Temporal, resp. rate 18, height 5' 11\" (1.803 m), weight 91.9 kg (202 lb 9.6 oz), SpO2 96%.      PHYSICAL EXAM    Gen: NAD  CV: RRR  CHEST: Clear  ABD: soft, NT/ND  EXT: no edema      ASSESSMENT/PLAN:  This is a 64 y.o. year old male here for EGD, colonoscopy, and he is stable and optimized for his procedure.          "

## 2025-04-21 NOTE — ANESTHESIA PREPROCEDURE EVALUATION
"Procedure:  COLONOSCOPY  EGD    Relevant Problems   ANESTHESIA (within normal limits)      CARDIO (within normal limits)   (-) Chest pain   (-) MI (myocardial infarction) (HCC)      ENDO (within normal limits)      GI/HEPATIC  NPO confirmed  BMI 28.3   (+) GERD (gastroesophageal reflux disease)      /RENAL (within normal limits)      HEMATOLOGY (within normal limits)      NEURO/PSYCH (within normal limits)   (-) CVA (cerebral vascular accident) (HCC)   (-) Seizures (HCC)      PULMONARY   (+) Asthma   (-) Sleep apnea   (-) URI (upper respiratory infection)      Other Pediatrics   (+) Polyp of stomach and duodenum      FEN/Gastrointestinal   (+) Diverticulitis of colon     Allergies   Allergen Reactions    Pollen Extract Allergic Rhinitis    Sulfa Antibiotics Hives     Other reaction(s): hives     Social History     Tobacco Use    Smoking status: Never    Smokeless tobacco: Never   Vaping Use    Vaping status: Never Used   Substance Use Topics    Alcohol use: Yes     Comment: seldom    Drug use: No     Current Outpatient Medications   Medication Instructions    albuterol (PROVENTIL HFA,VENTOLIN HFA) 90 mcg/act inhaler 2 puff(s)    Arnuity Ellipta 100 MCG/ACT AEPB inhaler inhale 1 puff (100 mcg total) daily.    Biotin 10 MG CAPS Take by mouth    loratadine (CLARITIN) 10 mg tablet 1 tab(s)    mometasone (NASONEX) 50 mcg/act nasal spray 2 sprays, Daily    omeprazole (PRILOSEC) 40 mg, Oral, Daily     No results found for: \"WBC\", \"HGB\", \"HCT\", \"PLT\", \"SODIUM\", \"K\", \"CL\", \"CO2\", \"BUN\", \"CREATININE\", \"GLUC\", \"HGBA1C\", \"HBA1C\", \"AST\", \"ALT\", \"GGT\", \"ALKPHOS\", \"TBILI\", \"ALB\", \"PROTIME\", \"PTT\", \"INR\"    Vitals:    04/21/25 1128   BP: 129/76   Pulse: 65   Resp: 18   Temp: 97.9 °F (36.6 °C)   SpO2: 96%     Physical Exam    Airway    Mallampati score: II  TM Distance: >3 FB  Neck ROM: full     Dental   Comment: Denies loose/chipped teeth, No notable dental hx     Cardiovascular  Rhythm: regular, Rate: normal, Cardiovascular exam " normal    Pulmonary  Pulmonary exam normal Breath sounds clear to auscultation    Other Findings        Anesthesia Plan  ASA Score- 2     Anesthesia Type- IV sedation with anesthesia with ASA Monitors.         Additional Monitors:     Airway Plan:     Comment: O2 mask, natural airway, EtCO2 monitor. Risks discussed including awareness, aspiration, drug reactions and conversion to GA..       Plan Factors-Exercise tolerance (METS): >4 METS.    Chart reviewed.   Existing labs reviewed. Patient summary reviewed.    Patient is not a current smoker.              Induction- intravenous.    Postoperative Plan-         Informed Consent- Anesthetic plan and risks discussed with patient.  I personally reviewed this patient with the CRNA. Discussed and agreed on the Anesthesia Plan with the CRNA..    NPO Status:  Vitals Value Taken Time   Date of last liquid 04/21/25 04/21/25 1129   Time of last liquid 0700 04/21/25 1129   Date of last solid 04/19/25 04/21/25 1129   Time of last solid 2300 04/21/25 1129

## 2025-04-21 NOTE — ANESTHESIA POSTPROCEDURE EVALUATION
Post-Op Assessment Note    CV Status:  Stable  Pain Score: 0    Pain management: adequate       Mental Status:  Sleepy   Hydration Status:  Euvolemic   PONV Controlled:  Controlled   Airway Patency:  Patent     Post Op Vitals Reviewed: Yes    No anethesia notable event occurred.    Staff: CRNA           Last Filed PACU Vitals:  Vitals Value Taken Time   Temp 97.2    Pulse 89    /62    Resp 14    SpO2 95

## 2025-04-28 PROCEDURE — 88305 TISSUE EXAM BY PATHOLOGIST: CPT | Performed by: STUDENT IN AN ORGANIZED HEALTH CARE EDUCATION/TRAINING PROGRAM

## 2025-04-30 ENCOUNTER — RESULTS FOLLOW-UP (OUTPATIENT)
Dept: GASTROENTEROLOGY | Facility: CLINIC | Age: 65
End: 2025-04-30

## 2025-04-30 NOTE — TELEPHONE ENCOUNTER
----- Message from Trip Melara DO sent at 4/30/2025  2:28 PM EDT -----  Please call the patient with the biopsy results.  The biopsy shows a short segment Gómez's esophagus.  The patient will undergo repeat endoscopy in 3 years.    The colon polyp was a benign polyp.  Your next colonoscopy is due in 5 years based on your family history.

## 2025-06-20 DIAGNOSIS — K21.9 GASTROESOPHAGEAL REFLUX DISEASE WITHOUT ESOPHAGITIS: ICD-10-CM

## 2025-06-23 RX ORDER — OMEPRAZOLE 40 MG/1
40 CAPSULE, DELAYED RELEASE ORAL DAILY
Qty: 90 CAPSULE | Refills: 1 | Status: SHIPPED | OUTPATIENT
Start: 2025-06-23